# Patient Record
Sex: MALE | Race: WHITE | NOT HISPANIC OR LATINO | Employment: FULL TIME | URBAN - METROPOLITAN AREA
[De-identification: names, ages, dates, MRNs, and addresses within clinical notes are randomized per-mention and may not be internally consistent; named-entity substitution may affect disease eponyms.]

---

## 2020-08-21 ENCOUNTER — CLINICAL SUPPORT (OUTPATIENT)
Dept: RESEARCH | Facility: CLINIC | Age: 60
End: 2020-08-21

## 2020-08-21 NOTE — PROGRESS NOTES
Date consent signed: 8/21/2020    Study title: A Phase 1/2/3. Placebo Controlled, Randomized, Observer-Blind, Dose-Finding Study to Describe the Safety, Tolerability, Immunogenicity and Potential Efficacy of SARS-COV-2 RNA Vaccine Candidates Against COVID-19 in Health Adults   IRB #: [id number]  Sponsor: [employer]   Sponsor's Protocol: [id number]  Site Number: [id number]  Subject ID: [id number]    Informed Consent Process  Present for discussion: myself, patient  Was the consent done electronically: yes     Prior to the Informed Consent (IC) being signed, or any protocol required testing, procedure, or intervention being performed, the following was done or discussed:  · Purpose of the Study, Qualifications to Participate: yes  · Study Design, Schedule and Procedures: yes  · Risks, Benefits, Alternative Treatments, Compensation and Costs: yes  · Confidentiality and HIPAA Authorization for Release of Medical Records for the research trial/subject's right/study related injury: yes  · Study related contact information: yes  · Voluntary Participation and Withdrawal from the research trial at any time: yes  · Patient has been offered the opportunity to ask questions regarding the study and all questions were answered satisfactorily: yes  · CRC and PI contact information given to patient: yes  · Signed copy given to patient: yes  · Copy in patient's chart and original uploaded to Epic: yes    Patient able to adequately summarize: the purpose of the study, the risks associated with the study, and all procedures, testing, and follow-ups associated with the study: yes    Bertin Jorgensen electronically signed the informed consent form with an IRB approval date of 8/6/2020. Each slide of the eConsent form was reviewed with him and all questions answered satisfactorily. He then electronically signed the consent form, which was countersigned by the CRC on this day. A copy of the fully executed ICF was then provided to the subject via e-mail.  The original consent was electronically saved and uploaded to the [facility] EMR ([employer]) and filed appropriately.    After signing consent, patient signed Medical Billing Release form and Registration Authorization form.     Person Obtaining Consent: Jose Desir

## 2020-08-21 NOTE — PROGRESS NOTES
Study title: A Phase 1/2/3. Placebo Controlled, Randomized, Observer-Blind, Dose-Finding Study to Describe the Safety, Tolerability, Immunogenicity and Potential Efficacy of SARS-COV-2 RNA Vaccine Candidates Against COVID-19 in Health Adults   IRB #: [id number]  Sponsor: [employer]   Sponsor's Protocol: [id number]  Site Number: [id number]  Subject ID: [id number]    Visit Assessments; 8/21/2020    Screening of inclusion/exclusion criteria evaluation for [id number] has been reviewed by Janessa Burrows. At this time, Bertin Jorgensen meets all inclusion and does not meet any one of the exclusion criteria.   Screening procedures completed during this visit include the following:   Demographic data (including gender, age, ethnicity, date of birth);    Height and weight obtained;   Vital Signs;  o B/P: 138/89  o Temperature: 97.3 F  o Pulse: 56   Reviewed and confirmed medical history in the past 6 months;   Review and confirmed of concomitant medications in the past 6 months;   Contraceptive discussion with Bertin Jorgensen. Patient expressed full understanding of adequate contraceptive measures and will contact site immediately if any changes are made in contraceptives;   Was UPT completed? No  o If no, UPT was not performed because subject is male     Physical exam deemed NOT necessary per P.I./Sub-I discretion. I, Dr Morris reviewed eligibility and agree with assessments. Subject will be randomized.     Subject screen failed due to history of Hep B/PI confirmed

## 2022-10-20 ENCOUNTER — PATIENT MESSAGE (OUTPATIENT)
Dept: RESEARCH | Facility: HOSPITAL | Age: 62
End: 2022-10-20